# Patient Record
Sex: FEMALE
[De-identification: names, ages, dates, MRNs, and addresses within clinical notes are randomized per-mention and may not be internally consistent; named-entity substitution may affect disease eponyms.]

---

## 2017-07-28 ENCOUNTER — HOSPITAL ENCOUNTER (INPATIENT)
Dept: HOSPITAL 42 - ED | Age: 47
LOS: 1 days | Discharge: HOME | DRG: 395 | End: 2017-07-29
Attending: INTERNAL MEDICINE | Admitting: INTERNAL MEDICINE
Payer: MEDICAID

## 2017-07-28 VITALS — BODY MASS INDEX: 23.6 KG/M2

## 2017-07-28 DIAGNOSIS — E78.5: ICD-10-CM

## 2017-07-28 DIAGNOSIS — R00.0: ICD-10-CM

## 2017-07-28 DIAGNOSIS — D50.9: Primary | ICD-10-CM

## 2017-07-28 DIAGNOSIS — I10: ICD-10-CM

## 2017-07-28 DIAGNOSIS — N92.0: ICD-10-CM

## 2017-07-28 DIAGNOSIS — D25.9: ICD-10-CM

## 2017-07-28 DIAGNOSIS — G93.89: ICD-10-CM

## 2017-07-28 DIAGNOSIS — Z83.3: ICD-10-CM

## 2017-07-28 DIAGNOSIS — G43.909: ICD-10-CM

## 2017-07-28 LAB
% IRON SATURATION: 15 % (ref 20–55)
ALBUMIN SERPL-MCNC: 4.4 G/DL (ref 3–4.8)
ALBUMIN/GLOB SERPL: 1.4 {RATIO} (ref 1.1–1.8)
ALT SERPL-CCNC: 23 U/L (ref 7–56)
APPEARANCE UR: CLEAR
AST SERPL-CCNC: 38 U/L (ref 15–39)
BASOPHILS # BLD AUTO: 0.03 K/MM3 (ref 0–2)
BASOPHILS NFR BLD: 0.3 % (ref 0–3)
BILIRUB UR-MCNC: NEGATIVE MG/DL
BUN SERPL-MCNC: 9 MG/DL (ref 7–21)
CALCIUM SERPL-MCNC: 9.5 MG/DL (ref 8.4–10.5)
COLOR UR: YELLOW
EOSINOPHIL # BLD: 0.2 10*3/UL (ref 0–0.7)
EOSINOPHIL NFR BLD: 2.3 % (ref 1.5–5)
ERYTHROCYTE [DISTWIDTH] IN BLOOD BY AUTOMATED COUNT: 17 % (ref 11.5–14.5)
GFR NON-AFRICAN AMERICAN: > 60
GLUCOSE UR STRIP-MCNC: NEGATIVE MG/DL
GRANULOCYTES # BLD: 5.85 10*3/UL (ref 1.4–6.5)
GRANULOCYTES NFR BLD: 67.2 % (ref 50–68)
HGB BLD-MCNC: 6.2 GM/DL (ref 12–16)
IRON SERPL-MCNC: 74 UG/DL (ref 45–180)
LEUKOCYTE ESTERASE UR-ACNC: NEGATIVE LEU/UL
LYMPHOCYTES # BLD: 2.2 10*3/UL (ref 1.2–3.4)
LYMPHOCYTES NFR BLD AUTO: 25.1 % (ref 22–35)
MCH RBC QN AUTO: 17.6 PG (ref 25–35)
MCHC RBC AUTO-ENTMCNC: 28.2 G/DL (ref 31–37)
MCV RBC AUTO: 62.5 FL (ref 80–105)
MONOCYTES # BLD AUTO: 0.4 10*3/UL (ref 0.1–0.6)
MONOCYTES NFR BLD: 5.1 % (ref 1–6)
PH UR STRIP: 7 [PH] (ref 4.7–8)
PLATELET # BLD: 473 10^3/UL (ref 120–450)
PMV BLD AUTO: 8.6 FL (ref 7–11)
PROT UR STRIP-MCNC: NEGATIVE MG/DL
RBC # BLD AUTO: 3.52 10^6/UL (ref 3.5–6.1)
RBC # UR STRIP: NEGATIVE /UL
SP GR UR STRIP: 1.02 (ref 1–1.03)
TIBC SERPL-MCNC: 512 UG/DL (ref 265–497)
TOTAL NUMBER OF RETICS COUNTED: 0 (ref 0.5–4)
URINE NITRATE: NEGATIVE
UROBILINOGEN UR STRIP-ACNC: 0.2 E.U./DL
WBC # BLD AUTO: 8.7 10^3/UL (ref 4.5–11)

## 2017-07-28 PROCEDURE — 30233N1 TRANSFUSION OF NONAUTOLOGOUS RED BLOOD CELLS INTO PERIPHERAL VEIN, PERCUTANEOUS APPROACH: ICD-10-PCS | Performed by: INTERNAL MEDICINE

## 2017-07-28 NOTE — ED PDOC
Arrival/HPI





- General


Time Seen by Provider: 17 17:38


Historian: Patient





- History of Present Illness


Narrative History of Present Illness (Text): 





17 17:39


47 y/o female, pmh including hyerlipidemia/anemia, nkda, c/o lt. sided headache 

x 3 days with no fall or trauma.  Sharp and throbbing, feels nausea with no 

vomiting, feels cold and fatigue lately, admits heavy menstrual period, no 

urinary symptoms, on and off, no black stools, no abdominal pain, no change in 

vision, no numbness or tingling, no neck pain, no palpitation, no rash, no 

other medical or psychological complaints. 








Past Medical History





- Provider Review


Nursing Documentation Reviewed: Yes





- Infectious Disease


Hx of Infectious Diseases: None





- Tetanus Immunization


Tetanus Immunization: Unknown





- Past Medical History


Past Medical History: No Previous





- Psychiatric


Hx Depression: No


Hx Emotional Abuse: No


Hx Physical Abuse: No


Hx Substance Use: No





- Past Surgical History


Past Surgical History: No Previous





- Surgical History


Hx  Section: Yes ()





- Anesthesia


Hx Anesthesia: No





- Suicidal Assessment


Feels Threatened In Home Enviroment: No





Family/Social History





- Physician Review


Nursing Documentation Reviewed: Yes


Family/Social History: Unknown Family HX


Smoking Status: Unknown If Ever Smoked


Hx Alcohol Use: No


Hx Substance Use: No


Hx Substance Use Treatment: No





Allergies/Home Meds


Allergies/Adverse Reactions: 


Allergies





No Known Allergies Allergy (Verified 17 17:25)


 








Home Medications: 


 Home Meds











 Medication  Instructions  Recorded  Confirmed


 


Ferrous Sulfate [Ferrous Sulfate] 0 mg PO DAILY 17














Review of Systems





- Review of Systems


Constitutional: Fatigue.  absent: Fevers


Eyes: absent: Vision Changes


ENT: absent: Hearing Changes


Respiratory: absent: SOB, Cough


Cardiovascular: absent: Chest Pain


Gastrointestinal: Nausea.  absent: Abdominal Pain, Diarrhea, Vomiting


Musculoskeletal: absent: Arthralgias, Back Pain


Neurological: Headache.  absent: Dizziness, Focal Weakness, Gait Changes, 

Speech Changes, Facial Droop





Physical Exam


Vital Signs Reviewed: Yes


Vital Signs











  Temp Pulse Resp BP Pulse Ox


 


 17 22:25  98.5 F  81  17  118/70 


 


 17 22:10  98.5 F  85  16  116/64 


 


 17 17:44  98.8 F  106 H  17  129/70  100











Temperature: Afebrile


Blood Pressure: Normal


Pulse: Tachycardic


Respiratory Rate: Normal


Appearance: Positive for: Well-Appearing, Non-Toxic, Uncomfortable


Pain Distress: Severe


Mental Status: Positive for: Alert and Oriented X 3





- Systems Exam


Head: Present: Atraumatic, Normocephalic, Other (mild +ttp on the lt. parietal 

scalp region with no visible rash or shingles. ).  No: Tenderness (no temporal 

artery tenderness and no jaw claudication)


Pupils: Present: PERRL


Extroacular Muscles: Present: EOMI


Conjunctiva: Present: Normal


Ears: Present: NORMAL TM, Normal Canal


Mouth: Present: Moist Mucous Membranes


Neck: Present: Normal Range of Motion


Respiratory/Chest: Present: Clear to Auscultation, Good Air Exchange.  No: 

Respiratory Distress, Accessory Muscle Use


Cardiovascular: Present: Regular Rate and Rhythm, Normal S1, S2.  No: Murmurs


Abdomen: Present: Normal Bowel Sounds.  No: Tenderness, Distention, Peritoneal 

Signs


Rectal: Present: Hemorrhoids, Normal Rectal Tone, Other (Female Chaperone: ER 

Scribe Anupoma).  No: Occult Blood, Rectal Tenderness, Gross Blood, Melena, 

Fissures, Nodule/Mass/Lesions


Back: Present: Normal Inspection


Upper Extremity: Present: Normal Inspection.  No: Cyanosis, Edema


Lower Extremity: Present: Normal Inspection.  No: Edema


Neurological: Present: GCS=15, Speech Normal, Motor Func Grossly Intact, Gait 

Normal, Memory Normal


Skin: Present: Warm, Dry, Normal Color.  No: Rashes


Psychiatric: Present: Alert, Oriented x 3, Normal Insight, Normal Concentration





Medical Decision Making


ED Course and Treatment: 





17 17:45


-CT head r/o tumor or bleed


-labs/ua


-IVF/reglan/benadryl/tylenol


-observe and reassess





17 19:14


-hgb 6.2 from 9.5, guaiac negative with female chaperone ER Scribe Jeanie.





17 20:14


-Labs are non-significant except hgb 6.2 from 9.5, symptomatic anemia, gauiac 

negative, admits heavy menstrual period which she has no vaginal bleeding now, 

2 units of PRBC/tylenol/benadryl ordered


-UA show no UTI


-Chest x-ray show no active disease


-EKG: NSR @ 87 BPM, no ST elevation or depression, no T wave inversion. 


-CT Head show no acute findings however MRI head need to be follow up as well 

there is focal lesion noted on the rt. frontal lobe.


-Headache resolved with the medication given in the ER but there is symptomatic 

anemia


-I discussed with Dr. chang about the case/labs/radiology result, agreed on the 

observation plan to the hospitalist service.


-I reviewed all labs and radiology results with the patient including the 

abnormal CT of the head. 


-Hospitalist paged. 





17 20:40


-I spoke to Dr.G Cronin and the medical residence, discussed about the labs/

radiology study results including the abnormal CT head, they agreed to admit 

the patient. 








- Lab Interpretations


Lab Results: 








 17 18:00 





 17 18:00 





 Lab Results





17 20:30: Blood Type Confirm O POSITIVE


17 18:55: Blood Type O POSITIVE, Antibody Screen Negative, Crossmatch See 

Detail, BBK History Checked No verified bt


17 18:00: Retic Count 1.32


17 18:00: Ferritin 3.4, Vitamin B12 514, Folate 7.5


17 18:00: Iron 74, TIBC 512 H, % Saturation 15 L


17 18:00: Sodium 140, Potassium 3.7, Chloride 103, Carbon Dioxide 25, 

Anion Gap 16, BUN 9, Creatinine 0.6, Est GFR (African Amer) > 60, Est GFR (Non-

Af Amer) > 60, Random Glucose 94, Calcium 9.5, Total Bilirubin 0.4, AST 38, ALT 

23, Alkaline Phosphatase 73, Total Protein 7.6, Albumin 4.4, Globulin 3.2, 

Albumin/Globulin Ratio 1.4


17 18:00: WBC 8.7, RBC 3.52, Hgb 6.2 L* D, Hct 22.0 L, MCV 62.5 L, MCH 

17.6 L, MCHC 28.2 L, RDW 17.0 H, Plt Count 473 H, MPV 8.6, Gran % 67.2, Lymph % 

(Auto) 25.1, Mono % (Auto) 5.1, Eos % (Auto) 2.3, Baso % (Auto) 0.3, Gran # 5.85

, Lymph # 2.2, Mono # 0.4, Eos # 0.2, Baso # 0.03


17 17:55: Urine Color Yellow, Urine Appearance Clear, Urine pH 7.0, Ur 

Specific Gravity 1.020, Urine Protein Negative, Urine Glucose (UA) Negative, 

Urine Ketones Negative, Urine Blood Negative, Urine Nitrate Negative, Urine 

Bilirubin Negative, Urine Urobilinogen 0.2, Ur Leukocyte Esterase Negative








I have reviewed the lab results: Yes


Interpretation: Abnormal lab values (hgb 6.2 from 9.5)





- RAD Interpretation


Radiology Orders: 








17 17:41


HEAD W/O CONTRAST [CT] Stat 





17 19:43


CHEST PORTABLE [RAD] Stat 











CT Head:





IMPRESSION:


- Focal 1.4 x 1.1 cm CSF/fluid density area in the right frontal white matter. 

The primary


differential considerations include a chronic lacunar type infarct and a white 

matter lesion,


such as demyelinating disease. MRI of the brain would be helpful for further 

evaluation, not


necessarily on an emergent basis, depending on clinical considerations.





- No evidence of acute intracranial hemorrhage or mass effect.


- See above for remaining findings.





Thank you for allowing us to participate in the care of your patient.


Dictated and Authenticated by: Kerri Watson MD





--------------------------------------------------------------------------------

-------------------


Chest x-ray: no acute infiltrate. 


: Radiologist





- EKG Interpretation


EKG Interpretation (Text): 





17 20:46


NSR @ 87 BPM, no ST elevation or depression, no T wave inversion. 


Interpreted by ED Physician: Yes


Type: 12 lead EKG





- Medication Orders


Current Medication Orders: 








Acetaminophen (Tylenol 325mg Tab)  650 mg PO Q4H PRN


   PRN Reason: Fever >100.4 F


Sodium Chloride (Sodium Chloride 0.9%)  1,000 mls @ 100 mls/hr IV .Q10H BAIRON





Discontinued Medications





Acetaminophen (Tylenol 325mg Tab)  650 mg PO STAT STA


   Stop: 17 17:43


   Last Admin: 17 18:25  Dose: 650 mg





Diphenhydramine HCl (Benadryl)  50 mg IVP STAT STA


   Stop: 17 17:42


   Last Admin: 17 18:24  Dose: 50 mg





Sodium Chloride (Sodium Chloride 0.9%)  1,000 mls @ 999 mls/hr IV .Q1H1M STA


   Stop: 17 18:41


   Last Admin: 17 18:25  Dose: 999 mls/hr





Sodium Chloride (Sodium Chloride 0.9%)  1,000 mls @ 100 mls/hr IV .Q10H BAIRON


Iron Sucrose 200 mg/ Sodium (Chloride)  110 mls @ 110 mls/hr IVPB ONCE ONE


   Stop: 17 10:59


Metoclopramide HCl (Reglan)  10 mg IVP STAT STA


   Stop: 17 17:42


   Last Admin: 17 18:24  Dose: 10 mg











- PA / NP / Resident Statement


MD/ has reviewed & agrees with the documentation as recorded.





Disposition/Present on Arrival





- Present on Arrival


Any Indicators Present on Arrival: No


History of DVT/PE: No


History of Uncontrolled Diabetes: No


Urinary Catheter: No


History of Decub. Ulcer: No


History Surgical Site Infection Following: None





- Disposition


Have Diagnosis and Disposition been Completed?: Yes


Diagnosis: 


 Signs and symptoms of anemia, Headache, Abnormal CT scan, head





Disposition: HOSPITALIZED


Disposition Time: 19:15


Patient Plan: Observation


Patient Problems: 


 Current Active Problems











Problem Status Onset


 


Abnormal CT scan, head Acute  


 


Headache Acute  


 


Signs and symptoms of anemia Acute  











Condition: STABLE

## 2017-07-28 NOTE — CT
EXAM:

  CT Head Without Intravenous Contrast



CLINICAL HISTORY:

  46 years old, female; Pain; Headache; Headache not specified; Additional 

info: Headache x 3 days



TECHNIQUE:

  Axial computed tomography images of the head/brain without intravenous 

contrast.  This CT exam was performed using one or more of the following dose 

reduction techniques:  automated exposure control, adjustment of the mA and/or 

kV according to patient size, and/or use of iterative reconstruction technique.



EXAM DATE/TIME:

  7/28/2017 5:41 PM



COMPARISON:

  No relevant prior studies available.



FINDINGS:

  BRAIN:  A 1.4 x 1.1 cm oval shaped, well-defined focal area of CSF/fluid 

density is seen in the right frontal white matter, in the centrum semi-ovale, 

image 40/series 2. There is no evidence of significant associated mass effect.  

No significant acute abnormality identified.  No acute hemorrhage seen within 

the brain.  No acute extra-axial fluid collections visualized.  No evidence of 

significant mass effect within the brain.  Normal gray-white matter 

differentiation.

  VENTRICLES:  No evidence of significant hydrocephalus.

  BONES/JOINTS:  No acute fractures or other acute bony abnormality noted.

  SOFT TISSUES:  No acute abnormality of the visualized soft tissues is seen.

  SINUSES:  Visualized paranasal sinuses appear clear.

  MASTOID AIR CELLS:  Mastoid air cells appear clear.



IMPRESSION:     

- Focal 1.4 x 1.1 cm CSF/fluid density area in the right frontal white matter. 

The primary differential considerations include a chronic lacunar type infarct 

and a white matter lesion, such as demyelinating disease. MRI of the brain 

would be helpful for further evaluation, not necessarily on an emergent basis, 

depending on clinical considerations.

- No evidence of acute intracranial hemorrhage or mass effect.

- See above for remaining findings.

## 2017-07-29 VITALS
HEART RATE: 78 BPM | RESPIRATION RATE: 18 BRPM | SYSTOLIC BLOOD PRESSURE: 117 MMHG | DIASTOLIC BLOOD PRESSURE: 80 MMHG | OXYGEN SATURATION: 99 % | TEMPERATURE: 98.4 F

## 2017-07-29 LAB
ALBUMIN SERPL-MCNC: 3.5 G/DL (ref 3–4.8)
ALBUMIN/GLOB SERPL: 1.2 {RATIO} (ref 1.1–1.8)
ALT SERPL-CCNC: 19 U/L (ref 7–56)
AST SERPL-CCNC: 27 U/L (ref 15–39)
BASOPHILS # BLD AUTO: 0.05 K/MM3 (ref 0–2)
BASOPHILS NFR BLD: 0.7 % (ref 0–3)
BUN SERPL-MCNC: 6 MG/DL (ref 7–21)
CALCIUM SERPL-MCNC: 8.6 MG/DL (ref 8.4–10.5)
EOSINOPHIL # BLD: 0.2 10*3/UL (ref 0–0.7)
EOSINOPHIL NFR BLD: 3 % (ref 1.5–5)
ERYTHROCYTE [DISTWIDTH] IN BLOOD BY AUTOMATED COUNT: 23.5 % (ref 11.5–14.5)
FERRITIN SERPL-MCNC: 3.4 NG/ML
FOLATE SERPL-MCNC: 7.5 NG/ML
FSH SERPL-ACNC: 4.6 MIU/ML
GFR NON-AFRICAN AMERICAN: > 60
GRANULOCYTES # BLD: 3.91 10*3/UL (ref 1.4–6.5)
GRANULOCYTES NFR BLD: 58.6 % (ref 50–68)
HGB BLD-MCNC: 8.6 GM/DL (ref 12–16)
LYMPHOCYTES # BLD: 2.1 10*3/UL (ref 1.2–3.4)
LYMPHOCYTES NFR BLD AUTO: 31.6 % (ref 22–35)
MCH RBC QN AUTO: 20.7 PG (ref 25–35)
MCHC RBC AUTO-ENTMCNC: 29.8 G/DL (ref 31–37)
MCV RBC AUTO: 69.6 FL (ref 80–105)
MONOCYTES # BLD AUTO: 0.4 10*3/UL (ref 0.1–0.6)
MONOCYTES NFR BLD: 6.1 % (ref 1–6)
PLATELET # BLD: 430 10^3/UL (ref 120–450)
PMV BLD AUTO: 9.2 FL (ref 7–11)
RBC # BLD AUTO: 4.15 10^6/UL (ref 3.5–6.1)
VIT B12 SERPL-MCNC: 514 PG/ML (ref 239–931)
WBC # BLD AUTO: 6.7 10^3/UL (ref 4.5–11)

## 2017-07-29 NOTE — CON
DATE:  2017



CHIEF COMPLAINT:  Abnormal CAT scan.



HISTORY OF PRESENT ILLNESS:  The patient is a 46-year-old woman with a

history of hyperlipidemia anemia, chronic migraines, uterine myoma and

menorrhagia who presented to the ER for generalized headache and dizziness

and was found to have symptomatic anemia with a hemoglobin of 6.2 status

post transfusion, which today is 8.6.  She had a CAT scan showing cystic

oval hypodensity in the right frontoparietal junction; therefore, she

underwent an MRI, which showed a 1 cm abnormal long TR signal in the left

internal capsule, nonspecific, but appears to be a white matter lesion, 1.4

cm cystic focus at the right frontoparietal vertex, in fact reflecting a

cystic encephalomalacia and would need to get an MRI with contrast to

further evaluate that, but currently her neuro exam is nonfocal.  She is

doing much better.



PAST MEDICAL HISTORY:  Hypertension, hyperlipidemia, migraines, anemia,

uterine myoma and menorrhagia.



REVIEW OF SYSTEMS:  A 14-point review of system is negative except as in

the HPI.



FAMILY HISTORY:  Noncontributory.



SOCIAL HISTORY: No illicit drug use, smoking, or ETOH abuse.



PAST SURGICAL HISTORY:   x2, last one was 11 years ago.



ALLERGIES:  NO KNOWN DRUG ALLERGIES.



MEDICATIONS:  Reviewed by nurse practitioner, see med reconciliation sheet.



PHYSICAL EXAMINATION

GENERAL:  The patient is seen at the bed, in no acute distress.

VITAL SIGNS:  Temperature 98.4, pulse rate of 78, blood pressure 117/80,

respiratory rate 18, and oxygen saturation 99% on room air.

HEENT:  Atraumatic and normocephalic.  PERRLA.  Extraocular muscles intact.

NECK:  Supple.  No JVD.  No adenopathy noted.

LUNGS:  Clear to auscultation.  No adventitious sounds.

HEART:  S1 and S2.  Normal rate and rhythm.  No murmurs, rubs, or gallops.

ABDOMEN:  Soft, nontender, nondistended.  Bowel sounds present.

EXTREMITIES:   No clubbing, no cyanosis.  Peripheral pulses 2+ felt

bilaterally.

NEUROLOGIC:  The patient is alert and oriented to person and place, month,

and year.  Speech is fluent without any errors..  Cranial nerves II through

XII intact.  Motor exam:  Moves all extremities equally.  Toes downgoing. 

Sensation:  Light touch, pinprick, proprioception and vibration is intact. 

DTRs 2+ throughout.  Coordination:  Finger-to-nose intact.  Gait is normal.



LABORATORY DATA:  Sodium 140, potassium 3.7, chloride of 102, carbon

dioxide 25, BUN of 9, creatinine 0.6, random glucose 94.  Hemoglobin is 8.8

now.



ASSESSMENT AND PLAN:  This is a 46-year-old woman with a past medical

history of hyperlipidemia, anemia, chronic migraine, uterine myoma and

menorrhagia who presented to the hospital with headache and dizziness for

the past few days, she is found to have a hemoglobin of 6.2, we transfused

2 units of RBCs and today's hemoglobin is 8.8, which is much better.  Neuro

exam is nonfocal and is called for an abnormal CAT scan showing fluid

density of 1.4 x 1 cm in the right frontal matter for which she underwent

an MRI of the brain without contrast, which showed a 1.4 cm cystic focus in

the right parietal frontal vertex representing cystic _____, but difficult

to delineate, therefore she needs an MRI of the brain with contrast, which

could be done as an outpatient.  She is neurologically stable from my

standpoint.  Monitor hemoglobin.





__________________________________________

Jh Pelayo MD



DD:  2017 12:17:30

DT:  2017 13:36:08

Job # [id number]

## 2017-07-29 NOTE — MRI
PROCEDURE:  MRI BRAIN WITHOUT CONTRAST



HISTORY:

eval of fluid density in R frontal white matter



COMPARISON:

Unenhanced head CT dated 07/20/2017 



TECHNIQUE:

Multiplanar, multisequence MR images of the brain were obtained 

without intravenous contrast enhancement.



FINDINGS:



HEMORRHAGE:

None



DWI:

No evidence of an acute or early subacute infarction.



BRAIN PARENCHYMA:

There is a 1.4 x 1.0 cm cystic structure identified the subcortical 

white matter at the right frontoparietal junction vertex medially. No 

septation nodularity or hemosiderin is seen related grossly.  

Follow-up contrast MRI is advised for the small area of potential 

posttraumatic or post infectious change or even focal infarction 

though this is not favored. It is well circumscribed there is also a 

small area of ill defined increased long-term solidified at the 

central left basal ganglia associated genu of the internal capsule no 

restricted diffusion is associated with this area and contrast may be 

helpful in evaluating this abnormality as well. 



Otherwise, the remaining gray and white matter signal intensity is 

normal above below the tentorium as well as throughout the brainstem. 

There is no mass effect and intrinsic signal throughout the remainder 

the white-matter including the corpus callosum is within normal 

limits. The midline anatomy appears within normal limits



VENTRICLES:

Unremarkable. No hydrocephalus.



CRANIUM:

Unremarkable.



ORBITS:

Grossly unremarkable.



PARANASAL SINUSES/MASTOIDS:

Clear



VASCULAR SYSTEM:

Skull base flow voids intact.



OTHER FINDINGS:

None. 



IMPRESSION:





1. An approximate 1 cm area of abnormal long TR signal at the left 

internal capsule genu is nonspecific, but appears to be a white 

matter lesion. 



2. 1.4 cm cystic focus at the right parietal/frontal vertex is 

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 



3. Follow-up brain MRI with contrast is advised for greater 

characterization of both of these abnormal findings which appear 

nonspecific.

## 2017-07-29 NOTE — CARD
--------------- APPROVED REPORT --------------





EKG Measurement

Heart [id number]

AR [id number]

[id number]

[id number]

[id number]



<Conclusion>

Normal sinus rhythm

Normal ECG

## 2017-07-29 NOTE — CP.PCM.HP
History of Present Illness





- History of Present Illness


History of Present Illness: 


This patient is a 46 year old women with a PMH of HLD, Anemia, Chronic Migraines

, uterine myoma and Menorrhagia who presented to the ER with complaints of 

Headache and Dizziness x 3 days. Patient states that she has had similar 

headaches in the past but this particular headache did not resolve with 

Tylenol.  Patient's headache did resolve post tylenol administration in the ED.

   Patient states that she gets her period once a month and it last 5 days but 

bleeds a lot. Her period just ended yesterday.  Patient was found to be Anemic 

in ER and pas transfused 2U 





ROS


   Denies: motor/sensory loss, Chest Pain, SOB, Abdominal Pain, Nausea, Vomiting

, or any urinary symptoms.   


   Complains of : Headache, Fatigue, and back pain.  





PMHx: HLD, Anemia, Chronic Migraines, uterine myoma, menorrhagia


PSHx:  x2 (last one 11 years ago),


Allergies: NKDA


Social History: Denies Illicit drugs, alcohol, or tobacco use.


Family History: Diabetes (Father) 








Present on Admission





- Present on Admission


Any Indicators Present on Admission: No





Review of Systems





- Review of Systems


All systems: reviewed and no additional remarkable complaints except





Past Patient History





- Infectious Disease


Hx of Infectious Diseases: None





- Tetanus Immunizations


Tetanus Immunization: Unknown





- Past Social History


Smoking Status: Never Smoked





- CARDIAC


Hx Cardiac Disorders: No





- PULMONARY


Hx Respiratory Disorders: No





- NEUROLOGICAL


Hx Neurological Disorder: Yes


Hx Migraine: Yes





- HEENT


Hx HEENT Problems: No





- RENAL


Hx Chronic Kidney Disease: No





- ENDOCRINE/METABOLIC


Hx Endocrine Disorders: No





- HEMATOLOGICAL/ONCOLOGICAL


Hx Blood Disorders: Yes


Hx Anemia: Yes





- INTEGUMENTARY


Hx Dermatological Problems: No





- MUSCULOSKELETAL/RHEUMATOLOGICAL


Hx Falls: No





- GASTROINTESTINAL


Hx Gastrointestinal Disorders: No


Hx Bowel Surgery: No





- GENITOURINARY/GYNECOLOGICAL


Hx Genitourinary Disorders: No





- PSYCHIATRIC


Hx Depression: No


Hx Emotional Abuse: No


Hx Physical Abuse: No


Hx Substance Use: No





- SURGICAL HISTORY


Hx  Section: Yes ()





- ANESTHESIA


Hx Anesthesia: No





Meds


Allergies/Adverse Reactions: 


 Allergies











Allergy/AdvReac Type Severity Reaction Status Date / Time


 


No Known Allergies Allergy   Verified 17 17:25














Physical Exam





- Constitutional


Appears: Non-toxic, No Acute Distress





- Head Exam


Head Exam: ATRAUMATIC, NORMOCEPHALIC





- Eye Exam


Eye Exam: EOMI, Normal appearance.  absent: Conjunctival injection, Scleral 

icterus





- ENT Exam


ENT Exam: Mucous Membranes Moist





- Neck Exam


Neck exam: Negative for: Tenderness





- Respiratory Exam


Respiratory Exam: Clear to Auscultation Bilateral, NORMAL BREATHING PATTERN.  

absent: Rales, Rhonchi, Wheezes, Respiratory Distress, Stridor





- Cardiovascular Exam


Cardiovascular Exam: +S1, +S2





Results





- Vital Signs


Recent Vital Signs: 





 Last Vital Signs











Temp  98.4 F   17 01:27


 


Pulse  77   17 01:27


 


Resp  18   17 01:27


 


BP  108/68   17 01:27


 


Pulse Ox  100   17 17:44














- Labs


Result Diagrams: 


 17 18:00





 17 18:00





Assessment & Plan





- Assessment and Plan (Free Text)


Assessment: 


 46 year old women with PMH of HLD, Anemia, Chronic Migraines, uterin Myoma, 

and Menorrhagia who presented who presented with Headache x 3 days.  CBC showed 

Hemoglobin of 6.2 and was transfused 2 units of Leuk Reduced PRBC.  Head CT 

impression read Focal 1.4 x 1.1cm CSF/Fluid density area in the right frontal 

white matter. The primary differential considerations include a chronic lacunar 

type infarct and a white matter lesionNo evidence of acute intracranial 

hemorrhage or mass effect. MRI was ordered and Neuro was consulted on the 

case. CXR showed no active disease. Patients headache was resolved in ED post 

Tylenol administration.  





Plan: 


1.  Headache


-Head CT showed fluid density in right frontal white matter.


-MRI 


-Tylenol


-UA was negative


-TSH


-Neuro Consult for CT findings (Dr. DAYDAY Pelayo)


-CMP





2. Microcytic Anemia (MCV  62.5) likely 2/2 Menorrhagia 


-Iron Studies  Iron (24), TIBC (512), Ferritin- Pending, Folate  Pending, B12  

Pending


-CBC


-Fluids


-2 Units Leuk Red. PRBC


-Iron Supplementation





3. History of Menorrhagia


-LH/FSH


-Pelvic US





Case reviewed and discussed with attending





Sherin Canales - PGY1








- Date & Time


Date: 17


Time: 22:35

## 2017-07-29 NOTE — CP.PCM.DIS
<Ryan Carrillo - Last Filed: 07/29/17 19:44>





Provider





- Provider


Date of Admission: 


07/28/17 20:40





Attending physician: 


Micheal Pearson MD





Primary care physician: 


Fidelina Rizvi MD





Consults: 





Neurology - Dr. Can


Time Spent in preparation of Discharge (in minutes): 45





Diagnosis





- Discharge Diagnosis


(1) Abnormal CT scan, head


Status: Chronic   Priority: Medium   





(2) Signs and symptoms of anemia


Status: Chronic   Priority: Medium   





Hospital Course





- Lab Results


Lab Results: 


 Most Recent Lab Values











WBC  6.7 10^3/ul (4.5-11.0)  D 07/29/17  06:00    


 


RBC  4.15 10^6/uL (3.5-6.1)   07/29/17  06:00    


 


Hgb  8.6 gm/dL (12.0-16.0)  L  07/29/17  06:00    


 


Hct  28.9 % (36.0-48.0)  L  07/29/17  06:00    


 


MCV  69.6 fL (80.0-105.0)  L  07/29/17  06:00    


 


MCH  20.7 pg (25.0-35.0)  L  07/29/17  06:00    


 


MCHC  29.8 g/dl (31.0-37.0)  L  07/29/17  06:00    


 


RDW  23.5 % (11.5-14.5)  H  07/29/17  06:00    


 


Plt Count  430 10^3/uL (120.0-450.0)   07/29/17  06:00    


 


MPV  9.2 fl (7.0-11.0)   07/29/17  06:00    


 


Gran %  58.6 % (50.0-68.0)   07/29/17  06:00    


 


Lymph % (Auto)  31.6 % (22.0-35.0)   07/29/17  06:00    


 


Mono % (Auto)  6.1 % (1.0-6.0)  H  07/29/17  06:00    


 


Eos % (Auto)  3.0 % (1.5-5.0)   07/29/17  06:00    


 


Baso % (Auto)  0.7 % (0.0-3.0)   07/29/17  06:00    


 


Gran #  3.91  (1.4-6.5)   07/29/17  06:00    


 


Lymph #  2.1  (1.2-3.4)   07/29/17  06:00    


 


Mono #  0.4  (0.1-0.6)   07/29/17  06:00    


 


Eos #  0.2  (0.0-0.7)   07/29/17  06:00    


 


Baso #  0.05 K/mm3 (0.0-2.0)   07/29/17  06:00    


 


Retic Count  1.32 % (0.5-1.5)   07/28/17  18:00    


 


Sodium  139 mmol/L (132-148)   07/29/17  06:00    


 


Potassium  3.9 mmol/L (3.6-5.0)   07/29/17  06:00    


 


Chloride  107 mmol/L ()   07/29/17  06:00    


 


Carbon Dioxide  23 mmol/L (21-33)   07/29/17  06:00    


 


Anion Gap  13  (10-20)   07/29/17  06:00    


 


BUN  6 mg/dL (7-21)  L  07/29/17  06:00    


 


Creatinine  0.5 mg/dL (0.5-1.4)   07/29/17  06:00    


 


Est GFR ( Amer)  > 60   07/29/17  06:00    


 


Est GFR (Non-Af Amer)  > 60   07/29/17  06:00    


 


Random Glucose  87 mg/dL ()   07/29/17  06:00    


 


Calcium  8.6 mg/dL (8.4-10.5)   07/29/17  06:00    


 


Iron  74 ug/dL ()   07/28/17  18:00    


 


TIBC  512 ug/dL (265-497)  H  07/28/17  18:00    


 


% Saturation  15 % (20-55)  L  07/28/17  18:00    


 


Ferritin  3.4 ng/mL  07/28/17  18:00    


 


Total Bilirubin  0.4 mg/dL (0.2-1.3)   07/29/17  06:00    


 


AST  27 U/L (15-39)   07/29/17  06:00    


 


ALT  19 U/L (7-56)   07/29/17  06:00    


 


Alkaline Phosphatase  66 U/L ()   07/29/17  06:00    


 


Total Protein  6.3 g/dL (5.8-8.3)   07/29/17  06:00    


 


Albumin  3.5 g/dL (3.0-4.8)   07/29/17  06:00    


 


Globulin  2.9 gm/dL  07/29/17  06:00    


 


Albumin/Globulin Ratio  1.2  (1.1-1.8)   07/29/17  06:00    


 


Vitamin B12  514 pg/mL (239-931)   07/28/17  18:00    


 


Folate  7.5 ng/mL  07/28/17  18:00    


 


TSH 3rd Generation  3.13 mIU/mL (0.46-4.68)   07/29/17  06:00    


 


FSH 3rd Generation  4.6 mIU/mL  07/29/17  06:00    


 


Luteinizing Hormone  2.6 mIU/mL  07/29/17  06:00    


 


Urine Color  Yellow  (YELLOW)   07/28/17  17:55    


 


Urine Appearance  Clear  (CLEAR)   07/28/17  17:55    


 


Urine pH  7.0  (4.7-8.0)   07/28/17  17:55    


 


Ur Specific Gravity  1.020  (1.005-1.035)   07/28/17  17:55    


 


Urine Protein  Negative mg/dL (<30 mg/dL)   07/28/17  17:55    


 


Urine Glucose (UA)  Negative mg/dL (NEGATIVE)   07/28/17  17:55    


 


Urine Ketones  Negative mg/dL (NEGATIVE)   07/28/17  17:55    


 


Urine Blood  Negative  (NEGATIVE)   07/28/17  17:55    


 


Urine Nitrate  Negative  (NEGATIVE)   07/28/17  17:55    


 


Urine Bilirubin  Negative  (NEGATIVE)   07/28/17  17:55    


 


Urine Urobilinogen  0.2 E.U./dL (<1 E.U./dL)   07/28/17  17:55    


 


Ur Leukocyte Esterase  Negative Leticia/uL (NEGATIVE)   07/28/17  17:55    


 


Blood Type  O POSITIVE   07/28/17  18:55    


 


Blood Type Confirm  O POSITIVE   07/28/17  20:30    


 


Antibody Screen  Negative   07/28/17  18:55    


 


Crossmatch  See Detail   07/28/17  18:55    


 


BBK History Checked  No verified bt   07/28/17  18:55    














- Hospital Course


Hospital Course: 





46 year old women with PMH of HLD, Anemia, Chronic Migraines, uterine myoma, 

and Menorrhagia who presented who presented with Headache x 3 days was found to 

have symptomatic anemia with a Hgb of 6.2. Pt was given 2 units PRBCs. Pt was 

also found to have Head CT which showed Focal 1.4 x 1.1cm CSF/Fluid density 

area in the right frontal white matter. Brain MRI with contrast showed benign 

cystic lesion which showed benign cystic encephalomalacia. Pt will require 

follow up MRI in 6 months.  Pelvic ultrasound showed large anterior fundal 

myoma. Pt has an appointment with OB/GYN on 10/4/17 at [facility] for a 

GYN procedure. Pt will continue all home medications. Pt will follow up with PMD

, Neurology, and OB/GYN within 1 week. 





Discharge Exam





- Head Exam


Head Exam: ATRAUMATIC, NORMAL INSPECTION, NORMOCEPHALIC





- ENT Exam


ENT Exam: Mucous Membranes Moist





- Respiratory Exam


Respiratory Exam: NORMAL BREATHING PATTERN, UNREMARKABLE.  absent: Wheezes





- Cardiovascular Exam


Cardiovascular Exam: RRR, +S1, +S2





- GI/Abdominal Exam


GI & Abdominal Exam: Normal Bowel Sounds, Soft.  absent: Tenderness





- Extremities Exam


Extremities exam: normal inspection





- Neurological Exam


Neurological exam: Alert, CN II-XII Intact, Oriented x3





- Psychiatric Exam


Psychiatric exam: Normal Affect, Normal Mood





- Skin


Skin Exam: Intact, Normal Color, Warm





Discharge Plan





- Follow Up Plan


Condition: STABLE


Disposition: HOME/ ROUTINE


Instructions:  Migraine Headache (DC), Iron Rich Diet (DC)


Additional Instructions: 


Follow up with PMD within 1 week





Follow up with Neurologist, Jh Henry, within 1 week Phone [phone]





Follow up with OB/GYN doctor as soon as possible





Continue home medications





Return to hospital if symptoms reoccur or worsen


Referrals: 


Fidelina Rizvi MD [Primary Care Provider] - 





<Tahmina Palomares - Last Filed: 07/29/17 21:23>





Provider





- Provider


Date of Admission: 


07/28/17 20:40





Attending physician: 


Micheal Pearson MD





Primary care physician: 


Fidelina Rizvi MD








Hospital Course





- Lab Results


Lab Results: 


 Most Recent Lab Values











WBC  6.7 10^3/ul (4.5-11.0)  D 07/29/17  06:00    


 


RBC  4.15 10^6/uL (3.5-6.1)   07/29/17  06:00    


 


Hgb  8.6 gm/dL (12.0-16.0)  L  07/29/17  06:00    


 


Hct  28.9 % (36.0-48.0)  L  07/29/17  06:00    


 


MCV  69.6 fL (80.0-105.0)  L  07/29/17  06:00    


 


MCH  20.7 pg (25.0-35.0)  L  07/29/17  06:00    


 


MCHC  29.8 g/dl (31.0-37.0)  L  07/29/17  06:00    


 


RDW  23.5 % (11.5-14.5)  H  07/29/17  06:00    


 


Plt Count  430 10^3/uL (120.0-450.0)   07/29/17  06:00    


 


MPV  9.2 fl (7.0-11.0)   07/29/17  06:00    


 


Gran %  58.6 % (50.0-68.0)   07/29/17  06:00    


 


Lymph % (Auto)  31.6 % (22.0-35.0)   07/29/17  06:00    


 


Mono % (Auto)  6.1 % (1.0-6.0)  H  07/29/17  06:00    


 


Eos % (Auto)  3.0 % (1.5-5.0)   07/29/17  06:00    


 


Baso % (Auto)  0.7 % (0.0-3.0)   07/29/17  06:00    


 


Gran #  3.91  (1.4-6.5)   07/29/17  06:00    


 


Lymph #  2.1  (1.2-3.4)   07/29/17  06:00    


 


Mono #  0.4  (0.1-0.6)   07/29/17  06:00    


 


Eos #  0.2  (0.0-0.7)   07/29/17  06:00    


 


Baso #  0.05 K/mm3 (0.0-2.0)   07/29/17  06:00    


 


Retic Count  1.32 % (0.5-1.5)   07/28/17  18:00    


 


Sodium  139 mmol/L (132-148)   07/29/17  06:00    


 


Potassium  3.9 mmol/L (3.6-5.0)   07/29/17  06:00    


 


Chloride  107 mmol/L ()   07/29/17  06:00    


 


Carbon Dioxide  23 mmol/L (21-33)   07/29/17  06:00    


 


Anion Gap  13  (10-20)   07/29/17  06:00    


 


BUN  6 mg/dL (7-21)  L  07/29/17  06:00    


 


Creatinine  0.5 mg/dL (0.5-1.4)   07/29/17  06:00    


 


Est GFR ( Amer)  > 60   07/29/17  06:00    


 


Est GFR (Non-Af Amer)  > 60   07/29/17  06:00    


 


Random Glucose  87 mg/dL ()   07/29/17  06:00    


 


Calcium  8.6 mg/dL (8.4-10.5)   07/29/17  06:00    


 


Iron  74 ug/dL ()   07/28/17  18:00    


 


TIBC  512 ug/dL (265-497)  H  07/28/17  18:00    


 


% Saturation  15 % (20-55)  L  07/28/17  18:00    


 


Ferritin  3.4 ng/mL  07/28/17  18:00    


 


Total Bilirubin  0.4 mg/dL (0.2-1.3)   07/29/17  06:00    


 


AST  27 U/L (15-39)   07/29/17  06:00    


 


ALT  19 U/L (7-56)   07/29/17  06:00    


 


Alkaline Phosphatase  66 U/L ()   07/29/17  06:00    


 


Total Protein  6.3 g/dL (5.8-8.3)   07/29/17  06:00    


 


Albumin  3.5 g/dL (3.0-4.8)   07/29/17  06:00    


 


Globulin  2.9 gm/dL  07/29/17  06:00    


 


Albumin/Globulin Ratio  1.2  (1.1-1.8)   07/29/17  06:00    


 


Vitamin B12  514 pg/mL (239-931)   07/28/17  18:00    


 


Folate  7.5 ng/mL  07/28/17  18:00    


 


TSH 3rd Generation  3.13 mIU/mL (0.46-4.68)   07/29/17  06:00    


 


FSH 3rd Generation  4.6 mIU/mL  07/29/17  06:00    


 


Luteinizing Hormone  2.6 mIU/mL  07/29/17  06:00    


 


Urine Color  Yellow  (YELLOW)   07/28/17  17:55    


 


Urine Appearance  Clear  (CLEAR)   07/28/17  17:55    


 


Urine pH  7.0  (4.7-8.0)   07/28/17  17:55    


 


Ur Specific Gravity  1.020  (1.005-1.035)   07/28/17  17:55    


 


Urine Protein  Negative mg/dL (<30 mg/dL)   07/28/17  17:55    


 


Urine Glucose (UA)  Negative mg/dL (NEGATIVE)   07/28/17  17:55    


 


Urine Ketones  Negative mg/dL (NEGATIVE)   07/28/17  17:55    


 


Urine Blood  Negative  (NEGATIVE)   07/28/17  17:55    


 


Urine Nitrate  Negative  (NEGATIVE)   07/28/17  17:55    


 


Urine Bilirubin  Negative  (NEGATIVE)   07/28/17  17:55    


 


Urine Urobilinogen  0.2 E.U./dL (<1 E.U./dL)   07/28/17  17:55    


 


Ur Leukocyte Esterase  Negative Leticia/uL (NEGATIVE)   07/28/17  17:55    


 


Blood Type  O POSITIVE   07/28/17  18:55    


 


Blood Type Confirm  O POSITIVE   07/28/17  20:30    


 


Antibody Screen  Negative   07/28/17  18:55    


 


Crossmatch  See Detail   07/28/17  18:55    


 


BBK History Checked  No verified bt   07/28/17  18:55    














Attending/Attestation





- Attestation


I have personally seen and examined this patient.: Yes


I have fully participated in the care of the patient.: Yes


I have reviewed all pertinent clinical information, including history, physical 

exam and plan: Yes


Notes (Text): 





07/29/17 21:21


Patient seen and examined at bedside. Labs, vitals and notes reviewed. She 

feels better post transfusion and reports good appetite. Case discussed with 

neurology including the Imaging findings and outpatient work up advised 

considering stable symptoms. patient has care established with her GYN and plan 

for outpatient procedure are in place at [facility] for her Menorrhagia. advised iron 

replacement therapy. Agree with the plan as outlined by the resident

## 2017-07-29 NOTE — US
HISTORY:

hx of menorrhagia; anemia



COMPARISON:

None available.



TECHNIQUE:

Transabdominal ultrasound of the pelvis was performed. Physical 

reaction menorrhagia and anemia.



FINDINGS:



UTERUS:

Measures 17.1 x 9.8 x 10.6 cm. Enlarged in size with inhomogeneous 

echotexture throughout the myometrium due to numerous myomata. An 

anterior myoma measures 6.1 x 5.8 x 7.6 cm.  A posterior fundal myoma 

measures 5.9 x 3.8 x 5.6 cm.



ENDOMETRIUM:

It is difficult to isolate the endometrium which may measures 7.5 mm 

thickness.  Alternately, this measure may have been made in the 

posterior margins of the large anterior fundal myoma. 



CERVIX:

No cervical abnormality identified.



RIGHT OVARY:

The right ovary is not identified sonographically.  This may be a 

function of prior right oophorectomy or obscuring by myomatous 

uterine changes.



LEFT OVARY:

Measures 4.0 x 2.1 x 3.6  cm. No solid mass. Normal flow. 1.6 x 1.5 x 

2.0 cm simple cyst identified likely representing a developing 

follicle.



FREE FLUID:

No significant free fluid noted.



OTHER FINDINGS:

None. 



IMPRESSION:

A grossly enlarged uterus identified due to myomatous uterine changes 

as discussed above.  The endometrium is not definitively isolated due 

to a large anterior fundal myoma. In dominant follicles identified 

left ovary. Right ovary not identified as per above. No suspicious 

right adnexal mass.

## 2017-07-29 NOTE — MRI
PROCEDURE:  MRI BRAIN WITH AND WITHOUT CONTRAST



HISTORY:

abnormal oval lession right periventricular



COMPARISON:

None. 



TECHNIQUE:

Multiplanar, multisequence MR images of the brain were obtained with 

and without intravenous contrast enhancement.



FINDINGS:



HEMORRHAGE:

None



DWI:

Not included current study



BRAIN PARENCHYMA:

Normal enhances appreciated throughout the brain including the right 

vertex and left internal capsule. The aforementioned findings remain 

nonspecific. The left internal capsule finding is likely a more 

problematic finding than the right frontal parietal vertex cystic 

structure which is simple in appearance and likely is a posttraumatic 

or postinfectious or even postinflammatory result from a distant 

prior insult. Follow-up MRI is advised in 4-6 months for the left 

internal capsule finding with differential diagnosis of possible 

demyelination, vasculitis, Lyme disease and others. 



ENHANCEMENT:

No abnormal intracranial enhancement.



VENTRICLES:

Unremarkable. No hydrocephalus.



CRANIUM:

Unremarkable.



ORBITS:

Grossly unremarkable.



PARANASAL SINUSES/MASTOIDS:

Clear



VASCULAR SYSTEM:

Skull base flow voids intact.



OTHER FINDINGS:

None .



IMPRESSION:





1. No enhances seen at the left internal capsule abnormality within 

average diagnosis stated above.  Follow-up MRI is advised without 

contrast and for 6 months.







2. No suspicious enhancing related to the right frontoparietal vertex 

cystic lesion which is felt to represent benign cystic 

encephalomalacia from distant prior insult as discussed above.

## 2018-02-05 ENCOUNTER — HOSPITAL ENCOUNTER (EMERGENCY)
Dept: HOSPITAL 42 - ED | Age: 48
Discharge: HOME | End: 2018-02-05
Payer: MEDICAID

## 2018-02-05 VITALS
TEMPERATURE: 98.7 F | DIASTOLIC BLOOD PRESSURE: 63 MMHG | HEART RATE: 93 BPM | RESPIRATION RATE: 18 BRPM | SYSTOLIC BLOOD PRESSURE: 104 MMHG | OXYGEN SATURATION: 99 %

## 2018-02-05 VITALS — BODY MASS INDEX: 23.6 KG/M2

## 2018-02-05 DIAGNOSIS — J02.9: Primary | ICD-10-CM

## 2018-02-05 NOTE — ED PDOC
Arrival/HPI





- General


Chief Complaint: ENT Problem


Time Seen by Provider: 18 12:03


Historian: Patient





- History of Present Illness


Time/Duration: Other (last night)


Symptom Onset: Gradual


Symptom Course: Worsening


Severity Level: Mild


Activities at Onset: Rest


Associated Symptoms (Text): 





18 12:08


Mild sore throat since last night.  No cough congestion URI fever/chills.





Past Medical History





- Infectious Disease


Hx of Infectious Diseases: None





- Tetanus Immunization


Tetanus Immunization: Unknown





- Past Medical History


Past Medical History: No Previous





- Cardiac


Hx Cardiac Disorders: No





- Pulmonary


Hx Respiratory Disorders: No





- Neurological


Hx Neurological Disorder: Yes


Hx Migraine: Yes





- HEENT


Hx HEENT Disorder: No





- Renal


Hx Renal Disorder: No





- Endocrine/Metabolic


Hx Endocrine Disorders: No





- Hematological/Oncological


Hx Anemia: Yes


Other/Comment: fibroid





- Integumentary


Hx Dermatological Disorder: No





- Musculoskeletal/Rheumatological


Hx Falls: No





- Gastrointestinal


Hx Gastrointestinal Disorders: No





- Genitourinary/Gynecological


Hx Genitourinary Disorders: No





- Psychiatric


Hx Psychophysiologic Disorder: No


Hx Substance Use: No





- Past Surgical History


Past Surgical History: No Previous





- Surgical History


Hx  Section: Yes ()





- Anesthesia


Hx Anesthesia Reactions: No





- Suicidal Assessment


Feels Threatened In Home Enviroment: No





Family/Social History





- Physician Review


Nursing Documentation Reviewed: Yes


Family/Social History: Unknown Family HX


Smoking Status: Never Smoked


Hx Alcohol Use: No


Hx Substance Use: No


Hx Substance Use Treatment: No





Allergies/Home Meds


Allergies/Adverse Reactions: 


Allergies





No Known Allergies Allergy (Verified 18 11:24)


 











Review of Systems





- Physician Review


All systems were reviewed & negative as marked: Yes





Physical Exam





Vital Signs











  Temp Pulse Resp BP Pulse Ox


 


 18 11:23  98.7 F  93 H  18  104/63  99











Temperature: Afebrile


Blood Pressure: Normal


Pulse: Regular


Respiratory Rate: Normal


Appearance: Positive for: Well-Appearing, Non-Toxic, Comfortable


Pain Distress: None


Mental Status: Positive for: Alert and Oriented X 3





- Systems Exam


Head: Present: Atraumatic, Normocephalic


Pupils: Present: PERRL


Extroacular Muscles: Present: EOMI


Conjunctiva: Present: Normal


Ears: Present: NORMAL TM, Normal Canal.  No: Erythema


Mouth: Present: Moist Mucous Membranes


Pharnyx: Present: ERYTHEMA.  No: EXUDATE, TONSILS ENLARGED, Peritonsilar 

Swelling, Uvular Deviation, Muffled/Hoarse Voice, Strider, Soft Palate/Uvular 

Edema


Neck: Present: Normal Range of Motion


Respiratory/Chest: Present: Clear to Auscultation, Good Air Exchange.  No: 

Respiratory Distress, Accessory Muscle Use


Cardiovascular: Present: Regular Rate and Rhythm, Normal S1, S2.  No: Murmurs


Skin: Present: Warm, Dry, Normal Color.  No: Rashes





Disposition/Present on Arrival





- Present on Arrival


Any Indicators Present on Arrival: No


History of DVT/PE: No


History of Uncontrolled Diabetes: No


Urinary Catheter: No


History of Decub. Ulcer: No


History Surgical Site Infection Following: None





- Disposition


Have Diagnosis and Disposition been Completed?: Yes


Diagnosis: 


 Pharyngitis





Disposition: HOME/ ROUTINE


Disposition Time: 12:10


Patient Plan: Discharge


Condition: GOOD


Discharge Instructions (ExitCare):  Pharyngitis (ED)


Additional Instructions: 


Tylenol or advil, cepacol or cepestat as directed on bottle as needed.


Prescriptions: 


Amoxicillin [Amoxil 250 mg Cap] 250 mg PO TID #21 cap


Referrals: 


Fidelina Rizvi MD [Primary Care Provider] - Follow up with primary

## 2018-08-23 ENCOUNTER — HOSPITAL ENCOUNTER (EMERGENCY)
Dept: HOSPITAL 42 - ED | Age: 48
Discharge: LEFT BEFORE DISCHARGE | End: 2018-08-23
Payer: MEDICAID

## 2018-08-23 VITALS — BODY MASS INDEX: 23.6 KG/M2

## 2018-08-23 DIAGNOSIS — Z02.89: Primary | ICD-10-CM

## 2018-08-23 DIAGNOSIS — M54.9: ICD-10-CM

## 2019-01-20 ENCOUNTER — HOSPITAL ENCOUNTER (EMERGENCY)
Dept: HOSPITAL 42 - ED | Age: 49
Discharge: HOME | End: 2019-01-20
Payer: MEDICAID

## 2019-01-20 VITALS — TEMPERATURE: 98.2 F | RESPIRATION RATE: 16 BRPM | HEART RATE: 82 BPM

## 2019-01-20 VITALS — OXYGEN SATURATION: 99 % | SYSTOLIC BLOOD PRESSURE: 122 MMHG | DIASTOLIC BLOOD PRESSURE: 90 MMHG

## 2019-01-20 VITALS — BODY MASS INDEX: 23.6 KG/M2

## 2019-01-20 DIAGNOSIS — M54.5: Primary | ICD-10-CM

## 2019-01-20 LAB
APPEARANCE UR: (no result)
BACTERIA #/AREA URNS HPF: (no result) /HPF
BILIRUB UR-MCNC: NEGATIVE MG/DL
COLOR UR: YELLOW
GLUCOSE UR STRIP-MCNC: NEGATIVE MG/DL
LEUKOCYTE ESTERASE UR-ACNC: NEGATIVE LEU/UL
PH UR STRIP: 6 [PH] (ref 4.7–8)
PROT UR STRIP-MCNC: NEGATIVE MG/DL
RBC # UR STRIP: (no result) /UL
RBC #/AREA URNS HPF: (no result) /HPF (ref 0–2)
SP GR UR STRIP: 1.02 (ref 1–1.03)
UROBILINOGEN UR STRIP-ACNC: 0.2 E.U./DL
WBC #/AREA URNS HPF: (no result) /HPF (ref 0–6)

## 2019-01-20 PROCEDURE — 81001 URINALYSIS AUTO W/SCOPE: CPT

## 2019-01-20 PROCEDURE — 99284 EMERGENCY DEPT VISIT MOD MDM: CPT

## 2019-01-20 PROCEDURE — 72110 X-RAY EXAM L-2 SPINE 4/>VWS: CPT

## 2019-01-20 PROCEDURE — 96372 THER/PROPH/DIAG INJ SC/IM: CPT

## 2019-01-20 NOTE — ED PDOC
Arrival/HPI





- General


Chief Complaint: Trauma


Time Seen by Provider: 19 11:19


Historian: Patient





- History of Present Illness


Narrative History of Present Illness (Text): 





19 13:06


49yo female with no pmhx who present with complaint of left sided lower back 

pain that radiates to her thigh posteriorly x 5days. States pain started after 

lifting heavy laundry bags. states she has been taking OTC ibuprofen and 

continued lifting weight. Came to ED today because she is still having pain. 

Denies focal weakness, saddle anesthesia, urinary/fecal incontinence, abdominal 

pain, nausea, vomiting, fever, chills, urinary symptoms, hematuria, any other 

complaint.





Past Medical History





- Provider Review


Nursing Documentation Reviewed: Yes





- Infectious Disease


Hx of Infectious Diseases: None





- Tetanus Immunization


Tetanus Immunization: Unknown





- Past Medical History


Past Medical History: No Previous





- Cardiac


Hx Cardiac Disorders: No





- Pulmonary


Hx Respiratory Disorders: No





- Neurological


Hx Neurological Disorder: Yes


Hx Migraine: Yes





- HEENT


Hx HEENT Disorder: No





- Renal


Hx Renal Disorder: No





- Endocrine/Metabolic


Hx Endocrine Disorders: No





- Hematological/Oncological


Hx Anemia: Yes


Other/Comment: fibroid





- Integumentary


Hx Dermatological Disorder: No





- Musculoskeletal/Rheumatological


Hx Falls: No





- Gastrointestinal


Hx Gastrointestinal Disorders: No





- Genitourinary/Gynecological


Hx Genitourinary Disorders: No





- Psychiatric


Hx Psychophysiologic Disorder: No


Hx Substance Use: No





- Past Surgical History


Past Surgical History: No Previous





- Surgical History


Hx  Section: Yes ()





- Anesthesia


Hx Anesthesia Reactions: No





- Suicidal Assessment


Feels Threatened In Home Enviroment: No





Family/Social History





- Physician Review


Nursing Documentation Reviewed: Yes


Family/Social History: Unknown Family HX


Smoking Status: Never Smoked


Hx Alcohol Use: No


Hx Substance Use: No


Hx Substance Use Treatment: No





Allergies/Home Meds


Allergies/Adverse Reactions: 


Allergies





No Known Allergies Allergy (Verified 19 11:24)


   











Review of Systems





- Physician Review


All systems were reviewed & negative as marked: Yes





- Review of Systems


Constitutional: Normal


Eyes: Normal


ENT: Normal


Respiratory: Normal


Cardiovascular: Normal


Gastrointestinal: Normal


Genitourinary Female: Normal


Musculoskeletal: Back Pain


Skin: Normal


Neurological: Normal


Endocrine: Normal


Hemo/Lymphatic: Normal


Psychiatric: Normal





Physical Exam


Vital Signs Reviewed: Yes





Vital Signs











  Temp Pulse Resp BP Pulse Ox


 


 19 11:26  98.1 F  92 H  18  122/90  99











Temperature: Afebrile


Blood Pressure: Normal


Pulse: Regular


Respiratory Rate: Normal


Appearance: Positive for: Well-Appearing, Non-Toxic, Comfortable


Pain Distress: None


Mental Status: Positive for: Alert and Oriented X 3





- Systems Exam


Head: Present: Atraumatic, Normocephalic


Pupils: Present: PERRL


Extroacular Muscles: Present: EOMI


Conjunctiva: Present: Normal


Mouth: Present: Moist Mucous Membranes


Neck: Present: Normal Range of Motion


Respiratory/Chest: Present: Clear to Auscultation, Good Air Exchange.  No: 

Respiratory Distress, Accessory Muscle Use


Cardiovascular: Present: Regular Rate and Rhythm, Normal S1, S2.  No: Murmurs


Abdomen: No: Tenderness, Distention, Peritoneal Signs


Back: Present: Paraspinal Tenderness (LEft sided lower paralumar tenderness), 

Pain with Leg Raise (LEft leg).  No: Midline Tenderness


Upper Extremity: Present: Normal Inspection.  No: Cyanosis, Edema


Lower Extremity: Present: Normal Inspection.  No: Edema


Neurological: Present: GCS=15, CN II-XII Intact, Speech Normal


Skin: Present: Warm, Dry, Normal Color.  No: Rashes


Psychiatric: Present: Alert, Oriented x 3, Normal Insight, Normal Concentration





Medical Decision Making


ED Course and Treatment: 





19 19:10


PT present to ED for stated history. 





Her pain was controlled in ED with medication.





she was ambulatory and neurologically intact





LS xray


IMPRESSION:


Unremarkable radiographs of the lumbar spine.





UA negative





Result was DW the





She was DC home with NSAID and muslce relaxer for MS pain. Referred to her PMD





- Lab Interpretations


Lab Results: 





                                        











Urine Color  Yellow  (YELLOW)   19  12:15    


 


Urine Appearance  Sl cloudy  (CLEAR)   19  12:15    


 


Urine pH  6.0  (4.7-8.0)   19  12:15    


 


Ur Specific Gravity  1.020  (1.005-1.035)   19  12:15    


 


Urine Protein  Negative mg/dL (<30 mg/dL)   19  12:15    


 


Urine Glucose (UA)  Negative mg/dL (NEGATIVE)   19  12:15    


 


Urine Ketones  Negative mg/dL (NEGATIVE)   19  12:15    


 


Urine Blood  Trace-lysed  (NEGATIVE)  H  19  12:15    


 


Urine Nitrate  Negative  (NEGATIVE)   19  12:15    


 


Urine Bilirubin  Negative  (NEGATIVE)   19  12:15    


 


Urine Urobilinogen  0.2 E.U./dL (<1 E.U./dL)   19  12:15    


 


Ur Leukocyte Esterase  Negative Leticia/uL (NEGATIVE)   19  12:15    


 


Urine RBC  0 - 2 /hpf (0-2)   19  12:15    


 


Urine WBC  0 - 2 /hpf (0-6)   19  12:15    


 


Ur Epithelial Cells  3 - 4 /hpf (0-5)   19  12:15    


 


Urine Bacteria  Few /hpf (NONE)   19  12:15    














- RAD Interpretation


Radiology Orders: 











19 12:16


LS SPINE WITH OBL > 18 YRS OLD [RAD] Stat 














- Medication Orders


Current Medication Orders: 














Discontinued Medications





Cyclobenzaprine HCl (Flexeril)  10 mg PO STAT STA


   Stop: 19 12:42


   Last Admin: 19 12:53  Dose: 10 mg





Ketorolac Tromethamine (Toradol)  60 mg IM STAT STA


   Stop: 19 12:42


   Last Admin: 19 12:53  Dose: 60 mg





MAR Pain Assessment


 Document     19 12:53  MD  (Rec: 19 12:54  MD  [id number]


     Pain Reassessment


      Is this a pain reassessment?               No


     Sleep


      Is patient sleeping during reassessment?   No


     Presence of Pain


      Presence of Pain                           Yes


     Pain Scale Used


     Protocol:  PSCALES


      Pain Scale Used                            Numeric


     Location


      Upper or Lower                             Lower


      Pain Location Body Site                    Back


                                                 Lumbar


     Description


      Description                                Acute


      Intensity of Pain at present               8


IM Administration Charges


 Document     19 12:53  MD  (Rec: 19 12:54  MD  [email or web address]


     Injection Site


      MAR Injection Site                         Right Gluteus José


     Charges for Administration


      # of IM Administrations                    1














Disposition/Present on Arrival





- Present on Arrival


Any Indicators Present on Arrival: No


History of DVT/PE: No


History of Uncontrolled Diabetes: No


Urinary Catheter: No


History of Decub. Ulcer: No


History Surgical Site Infection Following: None





- Disposition


Have Diagnosis and Disposition been Completed?: Yes


Diagnosis: 


 Back pain





Disposition: HOME/ ROUTINE


Disposition Time: 13:25


Patient Plan: Discharge


Condition: STABLE


Discharge Instructions (ExitCare):  Low Back Pain in Adults


Additional Instructions: 


Rest, Apply warm compress/shower


Follow up with your doctor/orthopedist


Return to ED for any new or worsening symptoms


Prescriptions: 


Cyclobenzaprine [Cyclobenzaprine HCl] 10 mg PO BID #12 tab


RX: Ibuprofen [Motrin Tab] 600 mg PO Q6 #20 tab


Referrals: 


Fidelina Rizvi MD [Primary Care Provider] - Follow up with primary


Silverio Mosley III, MD [Medical Doctor] - Follow up with primary


Forms:  [employer] (English), WORK NOTE

## 2019-01-20 NOTE — RAD
Date of service: 



01/20/2019



PROCEDURE:  Radiographs of the Lumbar Spine.



HISTORY:

back pain







COMPARISON:

No prior.



FINDINGS:



BONES:

Normal alignment. No listhesis. No fracture.



DISC SPACES:

Unremarkable.



OTHER FINDINGS:

None.



IMPRESSION:

Unremarkable radiographs of the lumbar spine.

## 2020-09-09 NOTE — RAD
HISTORY:

medical clearance  



COMPARISON:

PA and lateral chest 11/07/2016. 



FINDINGS:



LUNGS:

No active pulmonary disease. Somewhat diminished history volume.



PLEURA:

No significant pleural effusion identified, no pneumothorax apparent.



CARDIOVASCULAR:

Normal.



OSSEOUS STRUCTURES:

No significant abnormalities.



VISUALIZED UPPER ABDOMEN:

Normal.



OTHER FINDINGS:

None.



IMPRESSION:

Diminished inspiratory volume. Nevertheless, no acute infiltrate or 

pleural effusion identified. . Writer will get message to PSR.